# Patient Record
Sex: FEMALE | Race: WHITE | URBAN - METROPOLITAN AREA
[De-identification: names, ages, dates, MRNs, and addresses within clinical notes are randomized per-mention and may not be internally consistent; named-entity substitution may affect disease eponyms.]

---

## 2017-03-14 ENCOUNTER — HOSPITAL ENCOUNTER (EMERGENCY)
Facility: CLINIC | Age: 22
Discharge: HOME | End: 2017-03-14
Attending: EMERGENCY MEDICINE | Admitting: EMERGENCY MEDICINE
Payer: COMMERCIAL

## 2017-03-14 ENCOUNTER — APPOINTMENT (OUTPATIENT)
Dept: ULTRASOUND IMAGING | Facility: CLINIC | Age: 22
End: 2017-03-14
Attending: EMERGENCY MEDICINE
Payer: COMMERCIAL

## 2017-03-14 VITALS
SYSTOLIC BLOOD PRESSURE: 118 MMHG | RESPIRATION RATE: 20 BRPM | OXYGEN SATURATION: 98 % | DIASTOLIC BLOOD PRESSURE: 79 MMHG | WEIGHT: 147.27 LBS | TEMPERATURE: 96.2 F

## 2017-03-14 DIAGNOSIS — N39.0 URINARY TRACT INFECTION IN FEMALE: ICD-10-CM

## 2017-03-14 DIAGNOSIS — B96.89 BV (BACTERIAL VAGINOSIS): ICD-10-CM

## 2017-03-14 DIAGNOSIS — N76.0 BV (BACTERIAL VAGINOSIS): ICD-10-CM

## 2017-03-14 LAB
ALBUMIN UR-MCNC: NEGATIVE MG/DL
APPEARANCE UR: ABNORMAL
BACTERIA #/AREA URNS HPF: ABNORMAL /HPF
BILIRUB UR QL STRIP: NEGATIVE
COLOR UR AUTO: YELLOW
GLUCOSE UR STRIP-MCNC: NEGATIVE MG/DL
HCG UR QL: NEGATIVE
HGB UR QL STRIP: ABNORMAL
KETONES UR STRIP-MCNC: NEGATIVE MG/DL
LEUKOCYTE ESTERASE UR QL STRIP: ABNORMAL
MICRO REPORT STATUS: ABNORMAL
MUCOUS THREADS #/AREA URNS LPF: PRESENT /LPF
NITRATE UR QL: NEGATIVE
PH UR STRIP: 6 PH (ref 5–7)
RBC #/AREA URNS AUTO: 17 /HPF (ref 0–2)
SP GR UR STRIP: 1.02 (ref 1–1.03)
SPECIMEN SOURCE: ABNORMAL
SQUAMOUS #/AREA URNS AUTO: 2 /HPF (ref 0–1)
URN SPEC COLLECT METH UR: ABNORMAL
UROBILINOGEN UR STRIP-MCNC: 0 MG/DL (ref 0–2)
WBC #/AREA URNS AUTO: >182 /HPF (ref 0–2)
WET PREP SPEC: ABNORMAL

## 2017-03-14 PROCEDURE — 25000132 ZZH RX MED GY IP 250 OP 250 PS 637: Performed by: EMERGENCY MEDICINE

## 2017-03-14 PROCEDURE — 93976 VASCULAR STUDY: CPT

## 2017-03-14 PROCEDURE — 87210 SMEAR WET MOUNT SALINE/INK: CPT | Performed by: EMERGENCY MEDICINE

## 2017-03-14 PROCEDURE — 81025 URINE PREGNANCY TEST: CPT | Performed by: EMERGENCY MEDICINE

## 2017-03-14 PROCEDURE — 87491 CHLMYD TRACH DNA AMP PROBE: CPT | Performed by: EMERGENCY MEDICINE

## 2017-03-14 PROCEDURE — 99284 EMERGENCY DEPT VISIT MOD MDM: CPT | Mod: 25

## 2017-03-14 PROCEDURE — 87086 URINE CULTURE/COLONY COUNT: CPT | Performed by: EMERGENCY MEDICINE

## 2017-03-14 PROCEDURE — 87186 SC STD MICRODIL/AGAR DIL: CPT | Performed by: EMERGENCY MEDICINE

## 2017-03-14 PROCEDURE — 87591 N.GONORRHOEAE DNA AMP PROB: CPT | Performed by: EMERGENCY MEDICINE

## 2017-03-14 PROCEDURE — 81001 URINALYSIS AUTO W/SCOPE: CPT | Performed by: EMERGENCY MEDICINE

## 2017-03-14 PROCEDURE — 87088 URINE BACTERIA CULTURE: CPT | Performed by: EMERGENCY MEDICINE

## 2017-03-14 RX ORDER — PHENAZOPYRIDINE HYDROCHLORIDE 200 MG/1
200 TABLET, FILM COATED ORAL 3 TIMES DAILY PRN
Qty: 9 TABLET | Refills: 0 | Status: SHIPPED | OUTPATIENT
Start: 2017-03-14 | End: 2017-03-17

## 2017-03-14 RX ORDER — CEPHALEXIN 500 MG/1
500 CAPSULE ORAL ONCE
Status: COMPLETED | OUTPATIENT
Start: 2017-03-14 | End: 2017-03-14

## 2017-03-14 RX ORDER — LEVALBUTEROL TARTRATE 45 UG/1
2 AEROSOL, METERED ORAL EVERY 4 HOURS PRN
COMMUNITY

## 2017-03-14 RX ORDER — CEPHALEXIN 500 MG/1
500 CAPSULE ORAL 2 TIMES DAILY
Qty: 14 CAPSULE | Refills: 0 | Status: SHIPPED | OUTPATIENT
Start: 2017-03-14 | End: 2017-03-21

## 2017-03-14 RX ORDER — PHENAZOPYRIDINE HYDROCHLORIDE 100 MG/1
200 TABLET, FILM COATED ORAL ONCE
Status: COMPLETED | OUTPATIENT
Start: 2017-03-14 | End: 2017-03-14

## 2017-03-14 RX ORDER — METRONIDAZOLE 500 MG/1
500 TABLET ORAL 2 TIMES DAILY
Qty: 14 TABLET | Refills: 1 | Status: SHIPPED | OUTPATIENT
Start: 2017-03-14 | End: 2017-03-21

## 2017-03-14 RX ADMIN — PHENAZOPYRIDINE HYDROCHLORIDE 200 MG: 100 TABLET ORAL at 04:13

## 2017-03-14 RX ADMIN — CEPHALEXIN 500 MG: 500 CAPSULE ORAL at 04:13

## 2017-03-14 ASSESSMENT — ENCOUNTER SYMPTOMS
FREQUENCY: 1
DYSURIA: 1

## 2017-03-14 NOTE — ED AVS SNAPSHOT
[facility]    [address]    [facility] [address]    Phone:  [phone]    Fax:  [phone]                                       Kamila Gonzalez   MRN: [id number]    Department:  [facility]   Date of Visit:  3/14/2017           After Visit Summary Signature Page     I have received my discharge instructions, and my questions have been answered. I have discussed any challenges I see with this plan with the nurse or doctor.    ..........................................................................................................................................  Patient/Patient Representative Signature      ..........................................................................................................................................  Patient Representative Print Name and Relationship to Patient    ..................................................               ................................................  Date                                            Time    ..........................................................................................................................................  Reviewed by Signature/Title    ...................................................              ..............................................  Date                                                            Time

## 2017-03-14 NOTE — DISCHARGE INSTRUCTIONS
"   * BLADDER INFECTION,Female (Adult)    A bladder infection (\"cystitis\" or \"UTI\") usually causes a constant urge to urinate and a burning when passing urine. Urine may be cloudy, smelly or dark. There may be pain in the lower abdomen. A bladder infection occurs when bacteria from the vaginal area enter the bladder opening (urethra). This can occur from sexual intercourse, wearing tight clothing, dehydration and other factors.  HOME CARE:  1. Drink lots of fluids (at least 6-8 glasses a day, unless you must restrict fluids for other medical reasons). This will force the medicine into your urinary system and flush the bacteria out of your body. Cranberry juice has been shown to help clear out the bacteria.  2. Avoid sexual intercourse until your symptoms are gone.  3. A bladder infection is treated with antibiotics. You may also be given Pyridium (generic = phenazopyridine) to reduce the burning sensation. This medicine will cause your urine to become a bright orange color. The orange urine may stain clothing. You may wear a pad or panty-liner to protect clothing.  PREVENTING FUTURE INFECTIONS:  1. Always wipe from front to back after a bowel movement.  2. Keep the genital area clean and dry.  3. Drink plenty of fluids each day to avoid dehydration.  4. Urinate right after intercourse to flush out the bladder.  5. Wear cotton underwear and cotton-lined panty hose; avoid tight-fitting pants.  6. If you are on birth control pills and are having frequent bladder infections, discuss with your doctor.  FOLLOW UP: Return to this facility or see your doctor if ALL symptoms are not gone after three days of treatment.  GET PROMPT MEDICAL ATTENTION if any of the following occur:    Fever over 101 F (38.3 C)    No improvement by the third day of treatment    Increasing back or abdominal pain    Repeated vomiting; unable to keep medicine down    Weakness, dizziness or fainting    Vaginal discharge    Pain, redness or swelling in " the labia (outer vaginal area)    1160-1339 [employer], [address] [place] [address], [place] [address]. All rights reserved. This information is not intended as a substitute for professional medical care. Always follow your healthcare professional's instructions.        Vaginal Infection: Understanding the Vaginal Environment  The vagina is a canal. It connects the uterus (womb) to the outside of the body. The vagina is home to many types of bacteria and other tiny organisms. These different bacteria most often stay balanced in number. This keeps the vagina healthy. If the balance changes, an infection may result.   A Healthy Environment  Many types of bacteria are present in a healthy vagina. They don t cause problems if their amounts stay balanced. Small amounts of yeast may also be present without causing problems. The most common type of bacteria in the vagina is lactobacillus. It helps keep the vagina at a low pH. A low pH keeps bad bacteria from taking over.  Normal Vaginal Discharge  The vagina makes fluid. It is sent out as discharge. This keeps the vagina healthy. Normal discharge can be clear, white, or yellowish. Most women find that normal discharge varies in amount and color through the month.  An Unhealthy Environment  The vaginal environment may get out of balance. This may result in a vaginal infection. There are a few reasons this can happen. The pH may have changed. The amount of one organism, such as yeast, may increase. Or an outside organism may get into the vagina and throw off the balance:    Bacterial vaginosis (BV). BV is due to an imbalance in the normal bacteria in the vagina. Lactobacillus bacteria decrease. As a result, the numbers of bad bacteria increase.    Candidiasis(yeast infection). Yeast is a type of fungus. A yeast infection occurs when yeast cells in the vagina increase. They then attack vaginal tissues. A type of yeast called Candida albicans is often  involved.    Trichomoniasis ( trich ). Trich is a parasite. It is passed from one person to another during sex. Men with trich often don t have any symptoms. In women, it can take weeks or months before symptoms appear.    3085-5944 [employer]. [address] [place] [address]. All rights reserved. This information is not intended as a substitute for professional medical care. Always follow your healthcare professional's instructions.

## 2017-03-14 NOTE — ED PROVIDER NOTES
History   Chief Complaint:  UTI    HPI   Kamila Gonzalez is an otherwise healthy 21 year old female who presents to the emergency department for evaluation of urinary symptoms. Patient indicates that she began experiencing urinary symptoms about a week ago and has had ongoing symptoms since that time. In addition, patient indicates that she took plan B a few days and has had abdominal cramps three days after taking the plan B. Patient is also concerned about STD's and would like to get tested at this time. She denies any vaginal discharge or other associated symptoms.     Allergies:  NKDA    Medications:    Adderall   Xopenex     Past Medical History:    The patient denies any significant past medical history.    Past Surgical History:    ENT surgery   Gyn surgery     Family History:    No past pertinent family history.    Social History:  Current everyday smoker   Alcohol use-occasional      Review of Systems   Genitourinary: Positive for dysuria, frequency, pelvic pain and vaginal bleeding.   All other systems reviewed and are negative.    Physical Exam   First Vitals:  BP: 136/82  Heart Rate: 85  Temp: 96.2  F (35.7  C)  Resp: 20  Weight: 66.8 kg (147 lb 4.3 oz)  SpO2: 100 %    Physical Exam   HENT:   Right Ear: External ear normal.   Left Ear: External ear normal.   Nose: Nose normal.   Eyes: Conjunctivae and lids are normal.   Neck: Neck supple. No tracheal deviation present.   Cardiovascular: Regular rhythm and intact distal pulses.    Pulmonary/Chest: Breath sounds normal. No respiratory distress.   Abdominal: Soft. There is tenderness (+ ttp R lower quad near inguinal ligament, no periotontis, no CVA ttp). There is no rebound and no guarding.   Genitourinary:   Genitourinary Comments: Exam done with female chaperone in room (Ert/Rn).     Exam showed normal external genitalia,  No significnat vaginal discharge, small amount of blood present, no CMT, no adenexal ttp,      Musculoskeletal:   No peripheral  edema   Neurological:   MAEE, no gross focal motor or sensory deficit   Skin: Skin is warm and dry. She is not diaphoretic.   Psychiatric: She has a normal mood and affect.   Nursing note and vitals reviewed.        Emergency Department Course   Imaging:  Radiographic findings were communicated with the patient who voiced understanding of the findings.  US Pelvic complete w transvaginal  1. Unremarkable appearance of the uterus and ovaries. Blood flow is  visualized in both ovaries.  2. A trace amount of nonspecific free fluid in the pelvis. As per radiology.     Laboratory:  UA with micro: small blood, large Leukocyte Esterase, WBC/HPF >182(H), RBC/HPF 17(H), few bacteria, squamous epithelial 2(H), mucous present o/w negative  HCG qualitative: Negative   Microscopic wet-mount exam shows clue cells.  Chlamydia trachomatis:pending  Neisseria gonorrhea:pending    Interventions:  0413 Keflex 500mg PO  0413 Pyridium 200mg PO    Emergency Department Course:  Nursing notes and vitals reviewed. I performed an exam of the patient as documented above.     The patient provided a urine sample here in the emergency department. This was sent for laboratory testing, findings above.     Pelvic exam was performed with female ED nursing staff present in the room.  For details please see exam section above. GC and wet prep were collected, sent and pending at this time.     Impression & Plan    Medical Decision Making:  Kamila Gonzalez is a 21 year old female who presents with concerns for urinary tract infection and STI exposure. Examination shows right lower quadrant tenderness more in the right pelvis and Mcburney's point and mild CVA tenderness. Obvious urinary tract infection on UA here with a negative pregnancy test. No significant drainage on pelvic examination. We will treat for BV and UTI. Low risk sexual history and we will wait for GC chlamydia swap rather than empiric treatment.     Diagnosis:    ICD-10-CM    1. Urinary  tract infection in female N39.0    2. BV (bacterial vaginosis) N76.0     B96.89      Discharge Medication List as of 3/14/2017  5:27 AM      START taking these medications    Details   cephALEXin (KEFLEX) 500 MG capsule Take 1 capsule (500 mg) by mouth 2 times daily for 7 days, Disp-14 capsule, R-0, Local Print      phenazopyridine (PYRIDIUM) 200 MG tablet Take 1 tablet (200 mg) by mouth 3 times daily as needed for irritation, Disp-9 tablet, R-0, Local Print      metroNIDAZOLE (FLAGYL) 500 MG tablet Take 1 tablet (500 mg) by mouth 2 times daily for 7 days, Disp-14 tablet, R-1, Local PrintEat yogurt or cottage cheese daily to prevent diarrhea that can be caused by taking this medication.           Woody Said  3/14/2017   [facility] EMERGENCY DEPARTMENT    Woody JOHNSON Said, am serving as a scribe on 3/14/2017 at 3:29 AM to personally document services performed by Yadiel Becker, based on my observations and the provider's statements to me.        Yadiel Becker MD  03/14/17 2015

## 2017-03-14 NOTE — ED AVS SNAPSHOT
" [facility]    [address] [place] [address]    Phone:  [phone]    Fax:  [phone]                                       Kamila Gonzalez   MRN: [id number]    Department:  [facility]   Date of Visit:  3/14/2017           Patient Information     Date Of Birth          1995        Your diagnoses for this visit were:     Urinary tract infection in female     BV (bacterial vaginosis)        You were seen by Yadiel Becker MD.      Follow-up Information     Follow up with [facility] In 3 days.    Why:  As needed    Contact information:    [address] [facility] [address] [phone]          Discharge Instructions          * BLADDER INFECTION,Female (Adult)    A bladder infection (\"cystitis\" or \"UTI\") usually causes a constant urge to urinate and a burning when passing urine. Urine may be cloudy, smelly or dark. There may be pain in the lower abdomen. A bladder infection occurs when bacteria from the vaginal area enter the bladder opening (urethra). This can occur from sexual intercourse, wearing tight clothing, dehydration and other factors.  HOME CARE:  1. Drink lots of fluids (at least 6-8 glasses a day, unless you must restrict fluids for other medical reasons). This will force the medicine into your urinary system and flush the bacteria out of your body. Cranberry juice has been shown to help clear out the bacteria.  2. Avoid sexual intercourse until your symptoms are gone.  3. A bladder infection is treated with antibiotics. You may also be given Pyridium (generic = phenazopyridine) to reduce the burning sensation. This medicine will cause your urine to become a bright orange color. The orange urine may stain clothing. You may wear a pad or panty-liner to protect clothing.  PREVENTING FUTURE INFECTIONS:  1. Always wipe from front to back after a bowel movement.  2. Keep the " genital area clean and dry.  3. Drink plenty of fluids each day to avoid dehydration.  4. Urinate right after intercourse to flush out the bladder.  5. Wear cotton underwear and cotton-lined panty hose; avoid tight-fitting pants.  6. If you are on birth control pills and are having frequent bladder infections, discuss with your doctor.  FOLLOW UP: Return to this facility or see your doctor if ALL symptoms are not gone after three days of treatment.  GET PROMPT MEDICAL ATTENTION if any of the following occur:    Fever over 101 F (38.3 C)    No improvement by the third day of treatment    Increasing back or abdominal pain    Repeated vomiting; unable to keep medicine down    Weakness, dizziness or fainting    Vaginal discharge    Pain, redness or swelling in the labia (outer vaginal area)    8242-8902 [employer], [address] [place] [address], [place] [address]. All rights reserved. This information is not intended as a substitute for professional medical care. Always follow your healthcare professional's instructions.        Vaginal Infection: Understanding the Vaginal Environment  The vagina is a canal. It connects the uterus (womb) to the outside of the body. The vagina is home to many types of bacteria and other tiny organisms. These different bacteria most often stay balanced in number. This keeps the vagina healthy. If the balance changes, an infection may result.   A Healthy Environment  Many types of bacteria are present in a healthy vagina. They don t cause problems if their amounts stay balanced. Small amounts of yeast may also be present without causing problems. The most common type of bacteria in the vagina is lactobacillus. It helps keep the vagina at a low pH. A low pH keeps bad bacteria from taking over.  Normal Vaginal Discharge  The vagina makes fluid. It is sent out as discharge. This keeps the vagina healthy. Normal discharge can be clear, white, or yellowish. Most women find that normal  discharge varies in amount and color through the month.  An Unhealthy Environment  The vaginal environment may get out of balance. This may result in a vaginal infection. There are a few reasons this can happen. The pH may have changed. The amount of one organism, such as yeast, may increase. Or an outside organism may get into the vagina and throw off the balance:    Bacterial vaginosis (BV). BV is due to an imbalance in the normal bacteria in the vagina. Lactobacillus bacteria decrease. As a result, the numbers of bad bacteria increase.    Candidiasis(yeast infection). Yeast is a type of fungus. A yeast infection occurs when yeast cells in the vagina increase. They then attack vaginal tissues. A type of yeast called Candida albicans is often involved.    Trichomoniasis ( trich ). Trich is a parasite. It is passed from one person to another during sex. Men with trich often don t have any symptoms. In women, it can take weeks or months before symptoms appear.    5554-8627 [employer]. [address] [place] [address]. All rights reserved. This information is not intended as a substitute for professional medical care. Always follow your healthcare professional's instructions.          24 Hour Appointment Hotline       To make an appointment at any [facility], call [phone] ([phone]). If you don't have a family doctor or clinic, we will help you find one. [facility] clinics are conveniently located to serve the needs of you and your family.             Review of your medicines      START taking        Dose / Directions Last dose taken    cephALEXin 500 MG capsule   Commonly known as:  KEFLEX   Dose:  500 mg   Quantity:  14 capsule        Take 1 capsule (500 mg) by mouth 2 times daily for 7 days   Refills:  0        metroNIDAZOLE 500 MG tablet   Commonly known as:  FLAGYL   Dose:  500 mg   Quantity:  14 tablet        Take 1 tablet (500 mg) by mouth 2 times daily for 7 days    Refills:  1        phenazopyridine 200 MG tablet   Commonly known as:  PYRIDIUM   Dose:  200 mg   Quantity:  9 tablet        Take 1 tablet (200 mg) by mouth 3 times daily as needed for irritation   Refills:  0          Our records show that you are taking the medicines listed below. If these are incorrect, please call your family doctor or clinic.        Dose / Directions Last dose taken    ADDERALL XR PO        Refills:  0        levalbuterol 45 MCG/ACT Inhaler   Commonly known as:  XOPENEX HFA   Dose:  2 puff        Inhale 2 puffs into the lungs every 4 hours as needed for shortness of breath / dyspnea or wheezing   Refills:  0                Prescriptions were sent or printed at these locations (3 Prescriptions)                   Other Prescriptions                Printed at Department/Unit printer (3 of 3)         cephALEXin (KEFLEX) 500 MG capsule               phenazopyridine (PYRIDIUM) 200 MG tablet               metroNIDAZOLE (FLAGYL) 500 MG tablet                Procedures and tests performed during your visit     Chlamydia trachomatis PCR    HCG qualitative urine    Neisseria gonorrhoea PCR    Prep for procedure - pelvic exam    UA with Microscopic reflex to Culture    US Pelvic Complete w Transvaginal & Abd/Pel Duplex Limited    Urine Culture Aerobic Bacterial    Wet prep      Orders Needing Specimen Collection     None      Pending Results     Date and Time Order Name Status Description    3/14/2017 0347 Neisseria gonorrhoea PCR In process     3/14/2017 0347 Chlamydia trachomatis PCR In process     3/14/2017 0040 Urine Culture Aerobic Bacterial Preliminary             Pending Culture Results     Date and Time Order Name Status Description    3/14/2017 0347 Neisseria gonorrhoea PCR In process     3/14/2017 0347 Chlamydia trachomatis PCR In process     3/14/2017 0040 Urine Culture Aerobic Bacterial Preliminary              Test Results from your hospital stay     3/14/2017  1:18 AM - Tanya, May  Results      Component Results     Component Value Ref Range & Units Status    Color Urine Yellow  Final    Appearance Urine Cloudy  Final    Glucose Urine Negative NEG mg/dL Final    Bilirubin Urine Negative NEG Final    Ketones Urine Negative NEG mg/dL Final    Specific Gravity Urine 1.016 1.003 - 1.035 Final    Blood Urine Small (A) NEG Final    pH Urine 6.0 5.0 - 7.0 pH Final    Protein Albumin Urine Negative NEG mg/dL Final    Urobilinogen mg/dL 0.0 0.0 - 2.0 mg/dL Final    Nitrite Urine Negative NEG Final    Leukocyte Esterase Urine Large (A) NEG Final    Source Midstream Urine  Final    WBC Urine >182 (H) 0 - 2 /HPF Final    RBC Urine 17 (H) 0 - 2 /HPF Final    Bacteria Urine Few (A) NEG /HPF Final    Squamous Epithelial /HPF Urine 2 (H) 0 - 1 /HPF Final    Mucous Urine Present (A) NEG /LPF Final         3/14/2017  1:12 AM - Interface, Flexilab Results      Component Results     Component Value Ref Range & Units Status    HCG Qual Urine Negative NEG Final         3/14/2017  3:35 AM - Interface, Flexilab Results      Component Results     Component    Specimen Description    Midstream Urine    Special Requests    Specimen received in preservative    Culture Micro    Pending    Micro Report Status    Pending               3/14/2017  4:32 AM - Interface, Flexilab Results      Component Results     Component    Specimen Description    Vagina    Wet Prep (Abnormal)    No WBC'S seen  No Trichomonas seen  No yeast seen  Clue cells seen      Micro Report Status    FINAL 03/14/2017         3/14/2017  4:19 AM - Interface, Flexilab Results         3/14/2017  4:19 AM - Interface, Flexilab Results         3/14/2017  5:12 AM - Interface, Radiant Ib      Narrative     ULTRASOUND PELVIS WITH TRANSVAGINAL IMAGING AND DOPPLER  3/14/2017  4:59 AM     HISTORY: Right lower quadrant pain.    COMPARISON: None.    TECHNIQUE: Endovaginal imaging was also performed to better evaluate  the ovaries. Spectral waveform and color Doppler  evaluation were  performed of the ovaries.    FINDINGS: The uterus is anteflexed measuring 7.1 x 3.1 x 5.1 cm in  long axis, short axis, and transverse dimensions respectively. No  myometrial masses. The endometrial stripe measures 0.3 cm in  thickness. The right and left ovaries are unremarkable measuring 3.2 x  1.7 x 1.6 cm and 3.0 x 2.1 x 1.8 cm respectively. Blood flow is  visualized in both ovaries. No adnexal masses. A trace amount of  simple-appearing free fluid in the pelvis.        Impression     IMPRESSION:   1. Unremarkable appearance of the uterus and ovaries. Blood flow is  visualized in both ovaries.  2. A trace amount of nonspecific free fluid in the pelvis.    JB MOCTEZUMA MD                Clinical Quality Measure: Blood Pressure Screening     Your blood pressure was checked while you were in the emergency department today. The last reading we obtained was  BP: 118/79 . Please read the guidelines below about what these numbers mean and what you should do about them.  If your systolic blood pressure (the top number) is less than 120 and your diastolic blood pressure (the bottom number) is less than 80, then your blood pressure is normal. There is nothing more that you need to do about it.  If your systolic blood pressure (the top number) is 120-139 or your diastolic blood pressure (the bottom number) is 80-89, your blood pressure may be higher than it should be. You should have your blood pressure rechecked within a year by a primary care provider.  If your systolic blood pressure (the top number) is 140 or greater or your diastolic blood pressure (the bottom number) is 90 or greater, you may have high blood pressure. High blood pressure is treatable, but if left untreated over time it can put you at risk for heart attack, stroke, or kidney failure. You should have your blood pressure rechecked by a primary care provider within the next 4 weeks.  If your provider in the emergency department today  "gave you specific instructions to follow-up with your doctor or provider even sooner than that, you should follow that instruction and not wait for up to 4 weeks for your follow-up visit.        Thank you for choosing [facility]       Thank you for choosing [facility] for your care. Our goal is always to provide you with excellent care. Hearing back from our patients is one way we can continue to improve our services. Please take a few minutes to complete the written survey that you may receive in the mail after you visit with us. Thank you!        [employer] Information     [facility] lets you send messages to your doctor, view your test results, renew your prescriptions, schedule appointments and more. To sign up, go to [email or web address] . Click on \"Log in\" on the left side of the screen, which will take you to the Welcome page. Then click on \"Sign up Now\" on the right side of the page.     You will be asked to enter the access code listed below, as well as some personal information. Please follow the directions to create your username and password.     Your access code is: [email or web address]  Expires: 2017  5:27 AM     Your access code will  in 90 days. If you need help or a new code, please call your [facility] clinic or [phone].        Care EveryWhere ID     This is your Care EveryWhere ID. This could be used by other organizations to access your [facility] medical records  [id number]        After Visit Summary       This is your record. Keep this with you and show to your community pharmacist(s) and doctor(s) at your next visit.                  "

## 2017-03-15 LAB
BACTERIA SPEC CULT: ABNORMAL
C TRACH DNA SPEC QL NAA+PROBE: NORMAL
Lab: ABNORMAL
MICRO REPORT STATUS: ABNORMAL
MICROORGANISM SPEC CULT: ABNORMAL
N GONORRHOEA DNA SPEC QL NAA+PROBE: NORMAL
SPECIMEN SOURCE: ABNORMAL
SPECIMEN SOURCE: NORMAL
SPECIMEN SOURCE: NORMAL

## 2019-11-12 NOTE — ED NOTES
Glaucoma, Open AngleÂ (Chronic)    The eye is a fluid-filled globe with a lens in the front and a light-sensitive screen in the back (retina). Â The optic nerve conducts light signals from the retina to the brain and allows you to see visual images. Eye fluid is constantly produced within the eye and excess fluid drains out into the bloodstream.Â   Open-angle glaucoma is a condition where the fluid pressure in the eye gradually increases and reduces blood flow to the optic nerve. This causes a gradual loss of vision, which may progress to complete blindness if not treated. The cause for glaucoma is not known. Open-angle glaucoma is painless. The first symptoms may be loss of peripheral (side) vision. Since most people donât pay much attention to their peripheral vision, you may have a lot of vision loss before you become aware of the problem. The vision loss is permanent. Open-angle glaucoma can be treated with eyedrops, surgery, and sometimes pills, to lower the pressure in the eye. Treatment is usually successful at keeping pressures low and preventing further vision loss. However, the condition cannot be cured. You will need to receive treatment for the rest of your life. Regular follow-up care with an ophthalmologist (a medical doctor who specializes in eye care) is very important to follow your response to the medicines. Home Care  1. Take prescribed medicines exactly as directed. 2. The eye needs certain vitamins and minerals for good health--especially vitamins A, C, and E, as well as zinc and copper. Eat a healthy diet full of fruits and vegetables to ensure that you get enough of these nutrients. If you have trouble following a balanced diet, consider taking a vitamin and mineral supplement. 3. Drink 6-8 glasses of water in the course of a day. Drinking too much at one time (more than 1 quart) may increase eye pressure. 4. Limit the amount of caffeine that you drink.   5. Regular exercise (3 times a IN TRIAGE airway,breathing and circulation intact, without need for intervention . Alert and interacting appropriately for age and situation. Condom broke and took plan b now frequency urinating. Worried about possible std and uti   week) may help reduce eye pressure. Avoid exercise positions with your head below your waist (such as bending over). Â This position will increase eye pressure. Â Talk to your doctor about an appropriate exercise program for you. 6. Protect your eyes. An eye injury can cause increased eye pressure. Wear safety glasses or goggles when you play sports, use tools or machinery, or work with chemicals. Follow Up  With your eye doctor as advised by our staff. Regular appointments will help make sure that your treatment is helping to keep your eyes at a safe pressure. NOTE: Since open-angle glaucoma tends to run in families, other family members over the age of 36 should be examined by an eye doctor. Get Prompt Medical Attention  if any of the following occur:  Â· Further loss of peripheral vision  Â· Blurred vision  Â· Eye pain or redness  Â· Severe headache  Â· Sixes halos around lights  Â· Sudden loss of vision  Â© [address] [facility] The [address] [facility] [address] [facility] [address] All rights reserved. This information is not intended as a substitute for professional medical care. Always follow your healthcare professional's instructions. What Are Dry Eyes? Do your eyes ever sting, burn, or feel scratchy? To be comfortable, your eyes need to be bathed, or lubricated,Â with tears. Normally, there is always a film of tears on the surface of your eyes. But if your eyes donât produce enough tears, the surface gets irritated. This is known as dry eyes. Not enough lubricating tears  When you cry, or get something in your eye, or have an infection, your eyes make reflex tears. Each time you blink, another kind of tears, called lubricating tears, spread over the surface of your eyes. These tears keep the eyes moist and comfortable. You arenât aware of these tears because they stay on the surface of your eyes. WithoutÂ lubricating tears,Â your eyes get dry. Then they burn or sting and feel scratchy.  They may also water. But this doesnât relieve the dryness. That'sÂ because the eyes water with reflex tears, not lubricating tears. What causes dry eyes? Â· Aging  Â· Heaters and air conditioners  Â· Wind, smoke, or dry weather  Â· Allergies such as hay fever  Â· Medicines  Â· Eyelid problems, injuries to the eye, or diseases like rheumatoid arthritis  How lubricating tears flow  Lubricating tears flow from glands inÂ yourÂ upper eyelid over the surface of your eye. From your eye, the tears drain into canals that lead to your nose. Treating Dry Eyes    Artificial tears are the most common treatment for dry eyes. If they donât relieve your symptoms, your eye doctor may put in plugs. Or you may haveÂ surgery to stop the draining and increase the tear film. Artificial tears  Artificial tears, or lubricating eye drops, replace your natural lubricating tears. You can buy most lubricating eye drops without a prescription. And you can use them as often as needed. Lubricating eye drops are not the same as eye drops used to relieve redness or itching. Check with your eye doctor or pharmacist to be sure you buy the right drops. Some lubricating eye drops have chemicals called preservatives. ThisÂ makes them last longer. YourÂ eyes may be sensitive to these drops. OrÂ you may need to use them often. If so, you may want to buy lubricating eye drops made without preservatives. Your eye doctor may also suggest using a lubricating eye ointment at night. Please get artificial tears (over the counter). Brands that I recommend include Systane, Refresh, Blink, and Thera Tears. Avoid using Visine, Clear Eyes, generics, or drops that say get the red out. You can use them in both eyes 2-4 times per day and more often as needed. If we discuss an artificial tear ointment the following brands are recommended: Systane PM, Refresh PM, Refresh Lacri-Lube, or Puralube.     If we discussed using Restasis, please be aware that it may not be covered by insurance and can be quite expensive. In addition Restasis takes time to accumulate an effect. You should take it for 6 months, otherwise it may be difficult to determine whether or not it was effective. It is very common to have some burning and maybe even some redness with the Restasis. That tends to be self-limited. If it is persistent and/or you have colored discharge, decreased vision, or increased pain we should be notified immediately. Medicine  Your doctor may prescribe medicine such as cyclosporine to treat your eye condition. It can help increase your eyes' ability to make tears. Plugs    Closing the puncta with plugs can help keep the tear film on your eye. The plug acts like a stopper in a sink. It allows only a small amount of tears to drain out of your eye. Your eye doctor may first try short-term (temporary) plugs that dissolve in a few days. If these help, he or she may then put in long-term plugs. Your eyes will be numbed with drops when the plugs are inserted. You shouldnât feel any pain. And you shouldnât feel the plugs once theyâre in. Â   Surgery  If artificial tears or plugs donât relieve your dry eyes, surgery may be an option. Your eye doctor may do minor outpatient surgery to narrow or block the openings to the drainage canals. If your dry eyes are caused by eyelid problems, your eye doctor may recommend other kinds of surgery. Â© [address] [facility] The [address] [place] [address] [address]kw[address], [place], [address]. All rights reserved. This information is not intended as a substitute for professional medical care. Always follow your healthcare professional's instructions.

## 2023-08-25 PROBLEM — Z00.00 ENCOUNTER FOR PREVENTIVE HEALTH EXAMINATION: Status: ACTIVE | Noted: 2023-08-25

## 2023-09-07 ENCOUNTER — APPOINTMENT (OUTPATIENT)
Dept: OBGYN | Facility: CLINIC | Age: 28
End: 2023-09-07